# Patient Record
Sex: MALE | Race: WHITE | NOT HISPANIC OR LATINO | Employment: OTHER | URBAN - METROPOLITAN AREA
[De-identification: names, ages, dates, MRNs, and addresses within clinical notes are randomized per-mention and may not be internally consistent; named-entity substitution may affect disease eponyms.]

---

## 2018-03-21 PROBLEM — R07.9 CHEST PAIN: Status: ACTIVE | Noted: 2018-03-21

## 2018-03-22 PROBLEM — I21.3 ST ELEVATION MYOCARDIAL INFARCTION (STEMI): Status: ACTIVE | Noted: 2018-03-22

## 2018-03-22 PROBLEM — I21.21 STEMI INVOLVING LEFT CIRCUMFLEX CORONARY ARTERY: Status: ACTIVE | Noted: 2018-03-22

## 2018-03-22 PROBLEM — F17.200 TOBACCO DEPENDENCY: Status: ACTIVE | Noted: 2018-03-22

## 2018-03-22 PROBLEM — E78.00 HYPERCHOLESTEREMIA: Status: ACTIVE | Noted: 2018-03-22

## 2018-06-12 PROBLEM — I25.119 CORONARY ARTERY DISEASE INVOLVING NATIVE CORONARY ARTERY OF NATIVE HEART WITH ANGINA PECTORIS: Status: ACTIVE | Noted: 2018-06-12

## 2018-06-12 PROBLEM — Z95.5 HISTORY OF CORONARY ARTERY STENT PLACEMENT: Status: ACTIVE | Noted: 2018-06-12

## 2018-06-14 PROBLEM — R07.9 CHEST PAIN: Status: RESOLVED | Noted: 2018-03-21 | Resolved: 2018-06-14

## 2019-05-24 PROBLEM — R19.4 BOWEL HABIT CHANGES: Status: ACTIVE | Noted: 2019-05-24

## 2020-02-17 PROBLEM — I25.10 CORONARY ARTERY DISEASE INVOLVING NATIVE CORONARY ARTERY: Status: ACTIVE | Noted: 2018-06-12

## 2020-02-17 PROBLEM — I10 ESSENTIAL HYPERTENSION: Status: ACTIVE | Noted: 2020-02-17

## 2021-04-07 ENCOUNTER — IMMUNIZATION (OUTPATIENT)
Dept: PRIMARY CARE CLINIC | Facility: CLINIC | Age: 51
End: 2021-04-07
Payer: MEDICARE

## 2021-04-07 DIAGNOSIS — Z23 NEED FOR VACCINATION: Primary | ICD-10-CM

## 2021-05-05 ENCOUNTER — IMMUNIZATION (OUTPATIENT)
Dept: PRIMARY CARE CLINIC | Facility: CLINIC | Age: 51
End: 2021-05-05
Payer: MEDICARE

## 2021-05-05 DIAGNOSIS — Z23 NEED FOR VACCINATION: Primary | ICD-10-CM

## 2023-01-24 PROBLEM — I21.4 NSTEMI (NON-ST ELEVATED MYOCARDIAL INFARCTION): Status: ACTIVE | Noted: 2018-03-22

## 2023-01-25 PROBLEM — R07.9 CHEST PAIN: Status: RESOLVED | Noted: 2018-03-21 | Resolved: 2023-01-25

## 2023-01-30 ENCOUNTER — OFFICE VISIT (OUTPATIENT)
Dept: CARDIOLOGY | Facility: CLINIC | Age: 53
End: 2023-01-30
Payer: MEDICARE

## 2023-01-30 VITALS
BODY MASS INDEX: 32.47 KG/M2 | OXYGEN SATURATION: 97 % | HEIGHT: 75 IN | HEART RATE: 77 BPM | DIASTOLIC BLOOD PRESSURE: 74 MMHG | SYSTOLIC BLOOD PRESSURE: 112 MMHG | WEIGHT: 261.13 LBS

## 2023-01-30 DIAGNOSIS — E78.00 HYPERCHOLESTEREMIA: ICD-10-CM

## 2023-01-30 DIAGNOSIS — Z95.5 S/P DRUG ELUTING CORONARY STENT PLACEMENT: ICD-10-CM

## 2023-01-30 DIAGNOSIS — I10 ESSENTIAL HYPERTENSION: ICD-10-CM

## 2023-01-30 DIAGNOSIS — I25.10 CORONARY ARTERY DISEASE INVOLVING NATIVE CORONARY ARTERY OF NATIVE HEART WITHOUT ANGINA PECTORIS: Primary | ICD-10-CM

## 2023-01-30 PROCEDURE — 99999 PR PBB SHADOW E&M-EST. PATIENT-LVL III: CPT | Mod: PBBFAC,,, | Performed by: INTERNAL MEDICINE

## 2023-01-30 PROCEDURE — 99213 PR OFFICE/OUTPT VISIT, EST, LEVL III, 20-29 MIN: ICD-10-PCS | Mod: S$PBB,,, | Performed by: INTERNAL MEDICINE

## 2023-01-30 PROCEDURE — 99999 PR PBB SHADOW E&M-EST. PATIENT-LVL III: ICD-10-PCS | Mod: PBBFAC,,, | Performed by: INTERNAL MEDICINE

## 2023-01-30 PROCEDURE — 99213 OFFICE O/P EST LOW 20 MIN: CPT | Mod: PBBFAC,PN | Performed by: INTERNAL MEDICINE

## 2023-01-30 PROCEDURE — 99213 OFFICE O/P EST LOW 20 MIN: CPT | Mod: S$PBB,,, | Performed by: INTERNAL MEDICINE

## 2023-01-30 NOTE — PROGRESS NOTES
[facility] - Cardiology [address]  Cardiology Clinic Note      Chief Complaint  Chief Complaint   Patient presents with    Hospital Follow Up       HPI:      52-year-old male with past medical history tobacco use, hypertension, hyperlipidemia, CAD (left dominant or codominant) status post MI s/p PCI LPDA 2018 c/b wire perforation (previously) prior PCI same year proximal to mid LAD / OM1 / dLCx subsequently NSTEMI 01/2023 taken to cath lab culprit ostial to proximal OM2 status post PCI residual nonobstructive disease involving the ostial diagonal/ostial to proximal OM1/mid codominant RCA small vessel beyond the lesion), echo 01/24/2023 normal EF mild LVH normal diastolic function mild RVE with normal RV systolic function echo 2018 normal EF mild LVH normal diastolic function normal RV prior echo same year normal EF moderate LVH impaired relaxation     At discharge added Zetia to statin as the patient was unable to tolerate Lipitor 80 in the past     Patient doing well    Medications  Current Outpatient Medications   Medication Sig Dispense Refill    aspirin 81 MG Chew Take 1 tablet (81 mg total) by mouth once daily. 30 tablet 0    atorvastatin (LIPITOR) 40 MG tablet Take 1 tablet (40 mg total) by mouth once daily. 90 tablet 3    clopidogreL (PLAVIX) 75 mg tablet Take 1 tablet (75 mg total) by mouth once daily. 90 tablet 3    ezetimibe (ZETIA) 10 mg tablet Take 1 tablet (10 mg total) by mouth every evening. 90 tablet 3    metoprolol tartrate (LOPRESSOR) 25 MG tablet Take 1 tablet (25 mg total) by mouth 2 (two) times daily. 60 tablet 11    nitroGLYCERIN (NITROSTAT) 0.4 MG SL tablet Place 0.4 mg under the tongue every 5 (five) minutes as needed for Chest pain.      clopidogrel (PLAVIX) 75 mg tablet Take 75 mg by mouth once daily.      traZODone (DESYREL) 50 MG tablet Take 50 mg by mouth nightly as needed for Insomnia.       No current facility-administered medications for this visit.        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 ENDO;  Service: Endoscopy;  Laterality: N/A;    CORONARY ANGIOGRAPHY Right 1/25/2023    Procedure: ANGIOGRAM, CORONARY ARTERY;  Surgeon: Rashaad Gambino MD;  Location: [facility] CATH LAB;  Service: Cardiology;  Laterality: Right;    CORONARY ANGIOPLASTY WITH STENT PLACEMENT N/A 06/13/2018    Procedure: Angioplasty, Coronary Artery, With Stent Insertion;  Surgeon: Noe Benavides MD;  Location: [facility] CATH LAB;  Service: Cardiovascular;  Laterality: N/A;    CORONARY ANGIOPLASTY WITH STENT PLACEMENT Right 1/25/2023    Procedure: ANGIOPLASTY, CORONARY ARTERY, WITH STENT INSERTION;  Surgeon: Rashaad Gambino MD;  Location: [facility] CATH LAB;  Service: Cardiology;  Laterality: Right;    LEFT HEART CATHETERIZATION Left 06/13/2018    Procedure: Left heart cath;  Surgeon: Noe Benavides MD;  Location: [facility] CATH LAB;  Service: Cardiovascular;  Laterality: Left;     Social History     Socioeconomic History    Marital status:    Tobacco Use    Smoking status: Every Day     Packs/day: 2.00     Types: Cigarettes    Smokeless tobacco: Former     Quit date: 3/26/2018   Substance and Sexual Activity    Alcohol use: No    Drug use: No     Social Determinants of Health     Financial Resource Strain: Low Risk     Difficulty of Paying Living Expenses: Not hard at all   Food Insecurity: No Food Insecurity    Worried About Running Out of Food in the Last Year: Never true    Ran Out of Food in the Last Year: Never true   Transportation Needs: No Transportation Needs    Lack of  Transportation (Medical): No    Lack of Transportation (Non-Medical): No   Physical Activity: Unknown    Days of Exercise per Week: 5 days   Stress: No Stress Concern Present    Feeling of Stress : Only a little   Social Connections: Socially Integrated    Frequency of Communication with Friends and Family: More than three times a week    Frequency of Social Gatherings with Friends and Family: More than three times a week    Attends Church Services: 1 to 4 times per year    Active Member of Clubs or Organizations: Yes    Attends Club or Organization Meetings: 1 to 4 times per year    Marital Status:    Housing Stability: Low Risk     Unable to Pay for Housing in the Last Year: No    Number of Places Lived in the Last Year: 1    Unstable Housing in the Last Year: No     No family history on file.     Allergies  Review of patient's allergies indicates:  No Known Allergies    Review of Systems   Review of Systems   Constitutional: Negative for fever.   HENT:  Negative for nosebleeds.    Eyes:  Negative for visual disturbance.   Cardiovascular:  Negative for chest pain, claudication, dyspnea on exertion, palpitations and syncope.   Respiratory:  Negative for cough, hemoptysis and wheezing.    Endocrine: Negative for cold intolerance, heat intolerance, polyphagia and polyuria.   Hematologic/Lymphatic: Negative for bleeding problem.   Skin:  Negative for rash.   Musculoskeletal:  Negative for myalgias.   Gastrointestinal:  Negative for hematemesis, hematochezia, nausea and vomiting.   Genitourinary:  Negative for dysuria.   Neurological:  Negative for focal weakness and sensory change.   Psychiatric/Behavioral:  Negative for altered mental status.      Physical Exam  Vitals:    01/30/23 1355   BP: 112/74   Pulse: 77     Wt Readings from Last 1 Encounters:   01/30/23 118.5 kg (261 lb 2.2 oz)     Physical Exam  HENT:      Head: Normocephalic and atraumatic.      Mouth/Throat:      Mouth: Mucous membranes are  moist.   Eyes:      Extraocular Movements: Extraocular movements intact.      Pupils: Pupils are equal, round, and reactive to light.   Neck:      Vascular: No carotid bruit or JVD.   Cardiovascular:      Rate and Rhythm: Normal rate and regular rhythm.      Pulses: Normal pulses and intact distal pulses.      Heart sounds: Normal heart sounds. No murmur heard.    No friction rub. No gallop.   Pulmonary:      Effort: Pulmonary effort is normal.      Breath sounds: Normal breath sounds.   Abdominal:      Tenderness: There is no abdominal tenderness. There is no guarding or rebound.   Musculoskeletal:      Right lower leg: No edema.      Left lower leg: No edema.   Skin:     General: Skin is warm and dry.      Capillary Refill: Capillary refill takes less than 2 seconds.   Neurological:      General: No focal deficit present.      Mental Status: He is alert and oriented to person, place, and time.   Psychiatric:         Mood and Affect: Mood normal.       Labs  No results displayed because visit has over 200 results.          EKG  01/24/2023 01/24/2023 normal sinus rhythm normal rate inferior Q-waves nonspecific ST-T changes subtle    Echo   Results for orders placed or performed during the hospital encounter of 01/23/23   Echo   Result Value Ref Range    BSA 2.5 m2    TDI SEPTAL 0.08 m/s    LV LATERAL E/E' RATIO 4.42 m/s    LV SEPTAL E/E' RATIO 6.63 m/s    Left Ventricular Outflow Tract Mean Velocity 0.77 cm/s    Left Ventricular Outflow Tract Mean Gradient 2.65 mmHg    AORTIC VALVE CUSP SEPERATION 1.96 cm    TDI LATERAL 0.12 m/s    PV PEAK VELOCITY 1.21 cm/s    LVIDd 4.50 3.5 - 6.0 cm    IVS 1.38 0.6 - 1.1 cm    Posterior Wall 1.36 0.6 - 1.1 cm    LVIDs 3.57 2.1 - 4.0 cm    FS 21 28 - 44 %    LV mass 240.53 g    RVDD 3.44 cm    Left Ventricle Relative Wall Thickness 0.60 cm    AV Velocity Ratio 0.78     MV valve area p 1/2 method 2.93 cm2    E/A ratio 0.78     Mean e' 0.10 m/s    E wave deceleration time 249.57 msec     LVOT diameter 1.76 cm    LVOT area 2.4 cm2    LVOT peak jim 1.11 m/s    LVOT peak VTI 20.80 cm    Ao peak jim 1.43 m/s    LVOT stroke volume 50.58 cm3    AV peak gradient 8 mmHg    E/E' ratio 5.30 m/s    MV Peak E Jim 0.53 m/s    MV stenosis pressure 1/2 time 74.99 ms    MV Peak A Jim 0.68 m/s    LV Systolic Volume 53.39 mL    LV Systolic Volume Index 21.8 mL/m2    LV Diastolic Volume 116.73 mL    LV Diastolic Volume Index 47.64 mL/m2    LV Mass Index 98 g/m2    RA Major Axis 5.00 cm    Left Atrium Minor Axis 2.77 cm    Left Atrium Major Axis 5.40 cm    LA Volume Index (Mod) 18.8 mL/m2    LA volume (mod) 46.00 cm3    RA Width 2.01 cm    EF 60 %    Sinus 3.80 cm    Proximal aorta 3.60 cm    Narrative    · The left ventricle is normal in size with mild concentric hypertrophy   and normal systolic function.  · The estimated ejection fraction is 60%.  · Normal left ventricular diastolic function.  · Mild right ventricular enlargement with normal right ventricular   systolic function.  · Indeterminate central venous pressure.          Imaging  X-Ray Chest AP Portable    Result Date: 1/23/2023  EXAMINATION: XR CHEST AP PORTABLE CLINICAL HISTORY: chest pain; TECHNIQUE: Single frontal view of the chest was performed. COMPARISON: 04/12/2019, 07/26/2018 FINDINGS: Cardiac silhouette is not enlarged.  Lungs are clear.  There is no pleural effusion or pneumothorax.  Osseous structures are intact.     No acute abnormality. Electronically signed by: Dinah Alegria Date:    01/23/2023 Time:    17:02    Echo    Result Date: 1/24/2023  · The left ventricle is normal in size with mild concentric hypertrophy and normal systolic function. · The estimated ejection fraction is 60%. · Normal left ventricular diastolic function. · Mild right ventricular enlargement with normal right ventricular systolic function. · Indeterminate central venous pressure.      Cardiac catheterization    Result Date: 1/25/2023    The ostial 1st Diag lesion  was 50% stenosed.   The distal / apical  LAD lesion was 100% stenosed.   The 1st Mrg lesion was 60% stenosed (ostial to proximal).   The Mid RCA lesion was 80% stenosed.  Small vessel distal to lesion.   The 2nd Mrg-1 lesion was 90% stenosed with 0% stenosis post-intervention. This was the culprit lesion.   A STENT RESOLUTE CHARLY [id number] stent was successfully placed at 14 JAMIA for 30 sec in the ostial to proximal OM2 (overlapping with the prior stent) and post dilated with a 3.0 mm NC balloon.   Patent LAD stent.  Patent OM2 stent.  Patent LPDA stent. The procedure log was documented by Documenter: RT Anjua and verified by Rashaad Gambino MD. Date: 1/25/2023  Time: 8:20 AM       Prior coronary angiogram / intervention:  See HPI    Assessment and Plan  1. Coronary artery disease involving native coronary artery of native heart without angina pectoris  Continue dual antiplatelet therapy with aspirin 81 Plavix 75 in addition to metoprolol (may take 12.5 BID - split tab since he believes BP may be on the low side) and Lipitor 40 plus Zetia 10  No access site hematoma    2. Essential hypertension  BB controlled    3. Hypercholesteremia  Statin zetia goal LDL < 70 check next visit        Follow Up  3 months for FLP      Burton Segal MD, FACC, RPVI  Interventional Cardiology

## 2023-03-06 PROBLEM — I25.10 CORONARY ARTERY DISEASE INVOLVING NATIVE CORONARY ARTERY OF NATIVE HEART WITHOUT ANGINA PECTORIS: Status: ACTIVE | Noted: 2023-03-06

## 2023-05-01 ENCOUNTER — OFFICE VISIT (OUTPATIENT)
Dept: CARDIOLOGY | Facility: CLINIC | Age: 53
End: 2023-05-01
Payer: MEDICARE

## 2023-05-01 VITALS
DIASTOLIC BLOOD PRESSURE: 80 MMHG | SYSTOLIC BLOOD PRESSURE: 113 MMHG | HEART RATE: 68 BPM | OXYGEN SATURATION: 96 % | HEIGHT: 75 IN | WEIGHT: 275.44 LBS | BODY MASS INDEX: 34.25 KG/M2

## 2023-05-01 DIAGNOSIS — E78.00 HYPERCHOLESTEREMIA: ICD-10-CM

## 2023-05-01 DIAGNOSIS — I10 ESSENTIAL HYPERTENSION: ICD-10-CM

## 2023-05-01 DIAGNOSIS — I25.10 CORONARY ARTERY DISEASE INVOLVING NATIVE CORONARY ARTERY OF NATIVE HEART WITHOUT ANGINA PECTORIS: Primary | ICD-10-CM

## 2023-05-01 PROBLEM — I21.4 NSTEMI (NON-ST ELEVATED MYOCARDIAL INFARCTION): Status: RESOLVED | Noted: 2018-03-22 | Resolved: 2023-05-01

## 2023-05-01 PROCEDURE — 99213 OFFICE O/P EST LOW 20 MIN: CPT | Mod: PBBFAC,PN | Performed by: INTERNAL MEDICINE

## 2023-05-01 PROCEDURE — 99213 OFFICE O/P EST LOW 20 MIN: CPT | Mod: S$PBB,,, | Performed by: INTERNAL MEDICINE

## 2023-05-01 PROCEDURE — 99999 PR PBB SHADOW E&M-EST. PATIENT-LVL III: ICD-10-PCS | Mod: PBBFAC,,, | Performed by: INTERNAL MEDICINE

## 2023-05-01 PROCEDURE — 93005 ELECTROCARDIOGRAM TRACING: CPT | Mod: PBBFAC,PN | Performed by: INTERNAL MEDICINE

## 2023-05-01 PROCEDURE — 99999 PR PBB SHADOW E&M-EST. PATIENT-LVL III: CPT | Mod: PBBFAC,,, | Performed by: INTERNAL MEDICINE

## 2023-05-01 PROCEDURE — 93010 EKG 12-LEAD: ICD-10-PCS | Mod: S$PBB,,, | Performed by: INTERNAL MEDICINE

## 2023-05-01 PROCEDURE — 93010 ELECTROCARDIOGRAM REPORT: CPT | Mod: S$PBB,,, | Performed by: INTERNAL MEDICINE

## 2023-05-01 PROCEDURE — 99213 PR OFFICE/OUTPT VISIT, EST, LEVL III, 20-29 MIN: ICD-10-PCS | Mod: S$PBB,,, | Performed by: INTERNAL MEDICINE

## 2023-05-01 NOTE — PROGRESS NOTES
[facility] - Cardiology [address]  Cardiology Clinic Note      Chief Complaint  Chief Complaint   Patient presents with    Follow-up    Shortness of Breath     With exertion    Palpitations    Chest Pain     pressure       HPI:      52-year-old male with past medical history tobacco use, hypertension, hyperlipidemia, CAD (left dominant or codominant) status post MI s/p PCI LPDA 2018 c/b wire perforation (previously) prior PCI same year proximal to mid LAD / OM1 / dLCx subsequently NSTEMI 01/2023 taken to cath lab culprit ostial to proximal OM2 status post PCI residual nonobstructive disease involving the ostial diagonal/ostial to proximal OM1/mid codominant RCA small vessel beyond the lesion), echo 01/24/2023 normal EF mild LVH normal diastolic function mild RVE with normal RV systolic function echo 2018 normal EF mild LVH normal diastolic function normal RV prior echo same year normal EF moderate LVH impaired relaxation     At discharge for above NSTEMI added Zetia to statin as the patient was unable to tolerate Lipitor 80 in the past     Patient doing well    Medications  Current Outpatient Medications   Medication Sig Dispense Refill    aspirin 81 MG Chew Take 1 tablet (81 mg total) by mouth once daily. 30 tablet 0    atorvastatin (LIPITOR) 40 MG tablet Take 1 tablet (40 mg total) by mouth once daily. 90 tablet 3    clopidogreL (PLAVIX) 75 mg tablet Take 1 tablet (75 mg total) by mouth once daily. 90 tablet 3    ezetimibe (ZETIA) 10 mg tablet Take 1 tablet (10 mg total) by mouth every evening. 90 tablet 3    metoprolol tartrate (LOPRESSOR) 25 MG tablet Take 1 tablet (25 mg total) by mouth 2 (two) times daily. 60 tablet 11    nitroGLYCERIN (NITROSTAT) 0.4 MG SL tablet Place 0.4 mg under the tongue every 5 (five) minutes as needed for Chest pain.      traZODone (DESYREL) 50 MG tablet Take 50 mg by mouth nightly as needed for Insomnia.       No current facility-administered medications for this visit.         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    Hyperlipidemia     Hypertension     STEMI (ST elevation myocardial infarction) 03/2018     Past Surgical History:   Procedure Laterality Date    ANGIOGRAM, CORONARY ARTERY - Right  01/25/2023    prom OM 2 stent placed    COLONOSCOPY N/A 05/24/2019    Procedure: COLONOSCOPY;  Surgeon: Daniel Weston MD;  Location: [facility] ENDO;  Service: Endoscopy;  Laterality: N/A;    CORONARY ANGIOGRAPHY Right 1/25/2023    Procedure: ANGIOGRAM, CORONARY ARTERY;  Surgeon: Rashaad Gambino MD;  Location: [facility] CATH LAB;  Service: Cardiology;  Laterality: Right;    CORONARY ANGIOPLASTY WITH STENT PLACEMENT N/A 06/13/2018    Procedure: Angioplasty, Coronary Artery, With Stent Insertion;  Surgeon: Noe Benavides MD;  Location: [facility] CATH LAB;  Service: Cardiovascular;  Laterality: N/A;    CORONARY ANGIOPLASTY WITH STENT PLACEMENT Right 1/25/2023    Procedure: ANGIOPLASTY, CORONARY ARTERY, WITH STENT INSERTION;  Surgeon: Rashaad Gambino MD;  Location: [facility] CATH LAB;  Service: Cardiology;  Laterality: Right;    LEFT HEART CATHETERIZATION Left 06/13/2018    Procedure: Left heart cath;  Surgeon: Noe Benavides MD;  Location: [facility] CATH LAB;  Service: Cardiovascular;  Laterality: Left;     Social History     Socioeconomic History    Marital status:    Tobacco Use    Smoking status: Former     Packs/day: 2.00     Types: Cigarettes    Smokeless tobacco: Former     Quit date: 3/26/2018   Substance and Sexual Activity    Alcohol use: No    Drug use: No     Social Determinants of Health     Financial Resource Strain: Low Risk     Difficulty of Paying Living Expenses: Not hard at all   Food Insecurity: No Food Insecurity    Worried About Running Out of Food in the Last Year: Never true    Ran Out of Food in the Last Year: Never true   Transportation Needs: No Transportation  Needs    Lack of Transportation (Medical): No    Lack of Transportation (Non-Medical): No   Physical Activity: Unknown    Days of Exercise per Week: 5 days   Stress: No Stress Concern Present    Feeling of Stress : Only a little   Social Connections: Socially Integrated    Frequency of Communication with Friends and Family: More than three times a week    Frequency of Social Gatherings with Friends and Family: More than three times a week    Attends [employer] Services: 1 to 4 times per year    Active Member of Clubs or Organizations: Yes    Attends Club or Organization Meetings: 1 to 4 times per year    Marital Status:    Housing Stability: Low Risk     Unable to Pay for Housing in the Last Year: No    Number of Places Lived in the Last Year: 1    Unstable Housing in the Last Year: No     History reviewed. No pertinent family history.     Allergies  Review of patient's allergies indicates:  No Known Allergies    Review of Systems   Review of Systems   Constitutional: Negative for fever.   HENT:  Negative for nosebleeds.    Eyes:  Negative for visual disturbance.   Cardiovascular:  Negative for chest pain, claudication, dyspnea on exertion, palpitations and syncope.   Respiratory:  Negative for cough, hemoptysis and wheezing.    Endocrine: Negative for cold intolerance, heat intolerance, polyphagia and polyuria.   Hematologic/Lymphatic: Negative for bleeding problem.   Skin:  Negative for rash.   Musculoskeletal:  Negative for myalgias.   Gastrointestinal:  Negative for hematemesis, hematochezia, nausea and vomiting.   Genitourinary:  Negative for dysuria.   Neurological:  Negative for focal weakness and sensory change.   Psychiatric/Behavioral:  Negative for altered mental status.      Physical Exam  Vitals:    05/01/23 1105   BP: 113/80   Pulse: 68     Wt Readings from Last 1 Encounters:   05/01/23 125 kg (275 lb 7.4 oz)     Physical Exam  HENT:      Head: Normocephalic and atraumatic.      Mouth/Throat:       Mouth: Mucous membranes are moist.   Eyes:      Extraocular Movements: Extraocular movements intact.      Pupils: Pupils are equal, round, and reactive to light.   Neck:      Vascular: No carotid bruit or JVD.   Cardiovascular:      Rate and Rhythm: Normal rate and regular rhythm.      Pulses: Normal pulses and intact distal pulses.      Heart sounds: Normal heart sounds. No murmur heard.    No friction rub. No gallop.   Pulmonary:      Effort: Pulmonary effort is normal.      Breath sounds: Normal breath sounds.   Abdominal:      Tenderness: There is no abdominal tenderness. There is no guarding or rebound.   Musculoskeletal:      Right lower leg: No edema.      Left lower leg: No edema.   Skin:     General: Skin is warm and dry.      Capillary Refill: Capillary refill takes less than 2 seconds.   Neurological:      General: No focal deficit present.      Mental Status: He is alert and oriented to person, place, and time.   Psychiatric:         Mood and Affect: Mood normal.       Labs  No results displayed because visit has over 200 results.          EKG  01/24/2023 01/24/2023 normal sinus rhythm normal rate inferior Q-waves nonspecific ST-T changes subtle  EKG 05/01/2023 normal sinus rhythm normal rate subtle nonspecific ST-T changes inferior Q-waves    Echo   Results for orders placed or performed during the hospital encounter of 01/23/23   Echo   Result Value Ref Range    BSA 2.5 m2    TDI SEPTAL 0.08 m/s    LV LATERAL E/E' RATIO 4.42 m/s    LV SEPTAL E/E' RATIO 6.63 m/s    Left Ventricular Outflow Tract Mean Velocity 0.77 cm/s    Left Ventricular Outflow Tract Mean Gradient 2.65 mmHg    AORTIC VALVE CUSP SEPERATION 1.96 cm    TDI LATERAL 0.12 m/s    PV PEAK VELOCITY 1.21 cm/s    LVIDd 4.50 3.5 - 6.0 cm    IVS 1.38 0.6 - 1.1 cm    Posterior Wall 1.36 0.6 - 1.1 cm    LVIDs 3.57 2.1 - 4.0 cm    FS 21 28 - 44 %    LV mass 240.53 g    RVDD 3.44 cm    Left Ventricle Relative Wall Thickness 0.60 cm    AV Velocity  Ratio 0.78     MV valve area p 1/2 method 2.93 cm2    E/A ratio 0.78     Mean e' 0.10 m/s    E wave deceleration time 249.57 msec    LVOT diameter 1.76 cm    LVOT area 2.4 cm2    LVOT peak jim 1.11 m/s    LVOT peak VTI 20.80 cm    Ao peak jim 1.43 m/s    LVOT stroke volume 50.58 cm3    AV peak gradient 8 mmHg    E/E' ratio 5.30 m/s    MV Peak E Jim 0.53 m/s    MV stenosis pressure 1/2 time 74.99 ms    MV Peak A Jim 0.68 m/s    LV Systolic Volume 53.39 mL    LV Systolic Volume Index 21.8 mL/m2    LV Diastolic Volume 116.73 mL    LV Diastolic Volume Index 47.64 mL/m2    LV Mass Index 98 g/m2    RA Major Axis 5.00 cm    Left Atrium Minor Axis 2.77 cm    Left Atrium Major Axis 5.40 cm    LA Volume Index (Mod) 18.8 mL/m2    LA volume (mod) 46.00 cm3    RA Width 2.01 cm    EF 60 %    Sinus 3.80 cm    Proximal aorta 3.60 cm    Narrative    · The left ventricle is normal in size with mild concentric hypertrophy   and normal systolic function.  · The estimated ejection fraction is 60%.  · Normal left ventricular diastolic function.  · Mild right ventricular enlargement with normal right ventricular   systolic function.  · Indeterminate central venous pressure.          Imaging  X-Ray Chest AP Portable    Result Date: 1/23/2023  EXAMINATION: XR CHEST AP PORTABLE CLINICAL HISTORY: chest pain; TECHNIQUE: Single frontal view of the chest was performed. COMPARISON: 04/12/2019, 07/26/2018 FINDINGS: Cardiac silhouette is not enlarged.  Lungs are clear.  There is no pleural effusion or pneumothorax.  Osseous structures are intact.     No acute abnormality. Electronically signed by: Dinah Alegria Date:    01/23/2023 Time:    17:02    Echo    Result Date: 1/24/2023  · The left ventricle is normal in size with mild concentric hypertrophy and normal systolic function. · The estimated ejection fraction is 60%. · Normal left ventricular diastolic function. · Mild right ventricular enlargement with normal right ventricular systolic  function. · Indeterminate central venous pressure.      Cardiac catheterization    Result Date: 1/25/2023    The ostial 1st Diag lesion was 50% stenosed.   The distal / apical  LAD lesion was 100% stenosed.   The 1st Mrg lesion was 60% stenosed (ostial to proximal).   The Mid RCA lesion was 80% stenosed.  Small vessel distal to lesion.   The 2nd Mrg-1 lesion was 90% stenosed with 0% stenosis post-intervention. This was the culprit lesion.   A STENT RESOLUTE CHARLY [id number] stent was successfully placed at 14 JAMIA for 30 sec in the ostial to proximal OM2 (overlapping with the prior stent) and post dilated with a 3.0 mm NC balloon.   Patent LAD stent.  Patent OM2 stent.  Patent LPDA stent. The procedure log was documented by Documenter: RT Anuja and verified by Rashaad Gambino MD. Date: 1/25/2023  Time: 8:20 AM       Prior coronary angiogram / intervention:  See HPI    Assessment and Plan  1. Coronary artery disease involving native coronary artery of native heart without angina pectoris  Continue dual antiplatelet therapy with aspirin 81 Plavix 75 in addition to metoprolol (may take 12.5 BID - split tab since he believes BP may be on the low side) and Lipitor 40 plus Zetia 10    2. Essential hypertension  BB controlled    3. Hypercholesteremia  Statin zetia goal LDL < 70  Check lipids      Follow Up  6 months      Burton Segal MD, FACC, VI  Interventional Cardiology

## 2023-05-04 PROBLEM — R06.83 SNORING: Status: ACTIVE | Noted: 2023-05-04

## 2023-05-04 PROBLEM — R53.82 CHRONIC FATIGUE: Status: ACTIVE | Noted: 2023-05-04

## 2023-09-18 ENCOUNTER — PATIENT MESSAGE (OUTPATIENT)
Dept: PEDIATRICS | Facility: CLINIC | Age: 53
End: 2023-09-18
Payer: MEDICARE

## 2023-10-17 ENCOUNTER — PATIENT MESSAGE (OUTPATIENT)
Dept: PODIATRY | Facility: CLINIC | Age: 53
End: 2023-10-17
Payer: MEDICARE

## 2024-02-19 ENCOUNTER — TELEPHONE (OUTPATIENT)
Dept: CARDIOLOGY | Facility: CLINIC | Age: 54
End: 2024-02-19
Payer: MEDICARE

## 2024-02-19 NOTE — TELEPHONE ENCOUNTER
Spoke with Sandra, patient has had chest pains in the past and missed a follow up appointment that should have been in December.  She states he does not have any current systems and requests a follow up appointment.  Appointment scheduled 04/12/2024 and placed on wait list.  She verbalized understanding.

## 2024-02-19 NOTE — TELEPHONE ENCOUNTER
----- Message from Gladis Masterson sent at 2/19/2024  8:48 AM CST -----  Regarding: Appt  Contact: Sandra [phone]  Sandra/ wife is calling to schedule a appt for pt states he is having chests pains along with some other issues please call

## 2024-04-12 ENCOUNTER — OFFICE VISIT (OUTPATIENT)
Dept: CARDIOLOGY | Facility: CLINIC | Age: 54
End: 2024-04-12
Payer: MEDICARE

## 2024-04-12 VITALS
OXYGEN SATURATION: 97 % | DIASTOLIC BLOOD PRESSURE: 88 MMHG | WEIGHT: 298.38 LBS | SYSTOLIC BLOOD PRESSURE: 126 MMHG | HEART RATE: 75 BPM | BODY MASS INDEX: 37.1 KG/M2 | HEIGHT: 75 IN

## 2024-04-12 DIAGNOSIS — I25.10 CORONARY ARTERY DISEASE INVOLVING NATIVE CORONARY ARTERY OF NATIVE HEART WITHOUT ANGINA PECTORIS: ICD-10-CM

## 2024-04-12 DIAGNOSIS — E78.00 HYPERCHOLESTEREMIA: ICD-10-CM

## 2024-04-12 DIAGNOSIS — E66.01 SEVERE OBESITY (BMI 35.0-39.9) WITH COMORBIDITY: Primary | ICD-10-CM

## 2024-04-12 DIAGNOSIS — Z95.5 S/P DRUG ELUTING CORONARY STENT PLACEMENT: ICD-10-CM

## 2024-04-12 DIAGNOSIS — I10 ESSENTIAL HYPERTENSION: ICD-10-CM

## 2024-04-12 DIAGNOSIS — R07.9 CHEST PAIN, UNSPECIFIED TYPE: ICD-10-CM

## 2024-04-12 DIAGNOSIS — R06.09 DOE (DYSPNEA ON EXERTION): ICD-10-CM

## 2024-04-12 PROCEDURE — 99214 OFFICE O/P EST MOD 30 MIN: CPT | Mod: PBBFAC,PN | Performed by: INTERNAL MEDICINE

## 2024-04-12 PROCEDURE — 93010 ELECTROCARDIOGRAM REPORT: CPT | Mod: S$PBB,,, | Performed by: INTERNAL MEDICINE

## 2024-04-12 PROCEDURE — 93005 ELECTROCARDIOGRAM TRACING: CPT | Mod: PBBFAC,PN | Performed by: INTERNAL MEDICINE

## 2024-04-12 PROCEDURE — 99214 OFFICE O/P EST MOD 30 MIN: CPT | Mod: S$PBB,,, | Performed by: INTERNAL MEDICINE

## 2024-04-12 PROCEDURE — 99999 PR PBB SHADOW E&M-EST. PATIENT-LVL IV: CPT | Mod: PBBFAC,,, | Performed by: INTERNAL MEDICINE

## 2024-04-12 NOTE — PROGRESS NOTES
[facility] - Cardiology [address]  Cardiology Clinic Note      Chief Complaint  Chief Complaint   Patient presents with    Follow-up    Numbness     Bilateral hands       HPI:      52-year-old male with past medical history tobacco use, hypertension, hyperlipidemia, CAD (left dominant or codominant) status post MI s/p PCI LPDA 2018 c/b wire perforation (previously) prior PCI same year proximal to mid LAD / OM1 / dLCx subsequently NSTEMI 01/2023 taken to cath lab culprit ostial to proximal OM2 status post PCI residual nonobstructive disease involving the ostial diagonal/ostial to proximal OM1/mid codominant RCA small vessel beyond the lesion), echo 01/24/2023 normal EF mild LVH normal diastolic function mild RVE with normal RV systolic function echo 2018 normal EF mild LVH normal diastolic function normal RV prior echo same year normal EF moderate LVH impaired relaxation     At discharge for above MI added Zetia to statin as the patient was unable to tolerate Lipitor 80 in the past  Changed Lipitor to Crestor 40 given LDL not at goal     The patient states he has chronic stable dyspnea on exertion    Medications  Current Outpatient Medications   Medication Sig Dispense Refill    aspirin 81 MG Chew Take 1 tablet (81 mg total) by mouth once daily. 30 tablet 0    clopidogreL (PLAVIX) 75 mg tablet Take 1 tablet (75 mg total) by mouth once daily. 90 tablet 3    ezetimibe (ZETIA) 10 mg tablet Take 1 tablet (10 mg total) by mouth every evening. 90 tablet 3    metoprolol tartrate (LOPRESSOR) 25 MG tablet Take 1 tablet (25 mg total) by mouth 2 (two) times daily. 60 tablet 11    nitroGLYCERIN (NITROSTAT) 0.4 MG SL tablet Place 0.4 mg under the tongue every 5 (five) minutes as needed for Chest pain.      rosuvastatin (CRESTOR) 40 MG Tab Take 1 tablet (40 mg total) by mouth every evening. 90 tablet 3    traZODone (DESYREL) 50 MG tablet Take 50 mg by mouth nightly as needed for Insomnia. (Patient not taking: Reported on 4/12/2024)        No current facility-administered medications for this visit.        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    Hyperlipidemia     Hypertension     STEMI (ST elevation myocardial infarction) 03/2018     Past Surgical History:   Procedure Laterality Date    ANGIOGRAM, CORONARY ARTERY - Right  01/25/2023    prom OM 2 stent placed    COLONOSCOPY N/A 05/24/2019    Procedure: COLONOSCOPY;  Surgeon: Daniel Weston MD;  Location: [facility] ENDO;  Service: Endoscopy;  Laterality: N/A;    CORONARY ANGIOGRAPHY Right 1/25/2023    Procedure: ANGIOGRAM, CORONARY ARTERY;  Surgeon: Rashaad Gambino MD;  Location: [facility] CATH LAB;  Service: Cardiology;  Laterality: Right;    CORONARY ANGIOPLASTY WITH STENT PLACEMENT N/A 06/13/2018    Procedure: Angioplasty, Coronary Artery, With Stent Insertion;  Surgeon: Noe Benavides MD;  Location: [facility] CATH LAB;  Service: Cardiovascular;  Laterality: N/A;    CORONARY ANGIOPLASTY WITH STENT PLACEMENT Right 1/25/2023    Procedure: ANGIOPLASTY, CORONARY ARTERY, WITH STENT INSERTION;  Surgeon: Rashaad Gambino MD;  Location: [facility] CATH LAB;  Service: Cardiology;  Laterality: Right;    LEFT HEART CATHETERIZATION Left 06/13/2018    Procedure: Left heart cath;  Surgeon: Noe Benavides MD;  Location: [facility] CATH LAB;  Service: Cardiovascular;  Laterality: Left;     Social History     Socioeconomic History    Marital status:    Tobacco Use    Smoking status: Former     Current packs/day: 2.00     Types: Cigarettes    Smokeless tobacco: Former     Quit date: 3/26/2018   Substance and Sexual Activity    Alcohol use: No    Drug use: No     Social Determinants of Health     Financial Resource Strain: Low Risk  (1/24/2023)    Overall Financial Resource Strain (CARDIA)     Difficulty of Paying Living Expenses: Not hard at all   Food Insecurity: No Food Insecurity (1/24/2023)     Hunger Vital Sign     Worried About Running Out of Food in the Last Year: Never true     Ran Out of Food in the Last Year: Never true   Transportation Needs: No Transportation Needs (1/24/2023)    PRAPARE - Transportation     Lack of Transportation (Medical): No     Lack of Transportation (Non-Medical): No   Physical Activity: Unknown (1/24/2023)    Exercise Vital Sign     Days of Exercise per Week: 5 days   Stress: No Stress Concern Present (1/24/2023)    [employer] - Occupational Stress Questionnaire     Feeling of Stress : Only a little   Social Connections: Socially Integrated (1/24/2023)    Social Connection and Isolation Panel [NHANES]     Frequency of Communication with Friends and Family: More than three times a week     Frequency of Social Gatherings with Friends and Family: More than three times a week     Attends [employer] Services: 1 to 4 times per year     Active Member of Clubs or Organizations: Yes     Attends Club or Organization Meetings: 1 to 4 times per year     Marital Status:    Housing Stability: Low Risk  (1/24/2023)    Housing Stability Vital Sign     Unable to Pay for Housing in the Last Year: No     Number of Places Lived in the Last Year: 1     Unstable Housing in the Last Year: No     No family history on file.     Allergies  Review of patient's allergies indicates:  No Known Allergies    Review of Systems   Review of Systems   Constitutional: Negative for fever.   HENT:  Negative for nosebleeds.    Eyes:  Negative for visual disturbance.   Cardiovascular:  Positive for dyspnea on exertion. Negative for chest pain, claudication, palpitations and syncope.   Respiratory:  Negative for cough, hemoptysis and wheezing.    Endocrine: Negative for cold intolerance, heat intolerance, polyphagia and polyuria.   Hematologic/Lymphatic: Negative for bleeding problem.   Skin:  Negative for rash.   Musculoskeletal:  Negative for myalgias.   Gastrointestinal:   Negative for hematemesis, hematochezia, nausea and vomiting.   Genitourinary:  Negative for dysuria.   Neurological:  Negative for focal weakness and sensory change.   Psychiatric/Behavioral:  Negative for altered mental status.        Physical Exam  Vitals:    04/12/24 1423   BP: 126/88   Pulse: 75     Wt Readings from Last 1 Encounters:   04/12/24 135.3 kg (298 lb 6.3 oz)     Physical Exam  HENT:      Head: Normocephalic and atraumatic.      Mouth/Throat:      Mouth: Mucous membranes are moist.   Eyes:      Extraocular Movements: Extraocular movements intact.      Pupils: Pupils are equal, round, and reactive to light.   Neck:      Vascular: No carotid bruit or JVD.   Cardiovascular:      Rate and Rhythm: Normal rate and regular rhythm.      Pulses: Normal pulses and intact distal pulses.      Heart sounds: Normal heart sounds. No murmur heard.     No friction rub. No gallop.   Pulmonary:      Effort: Pulmonary effort is normal.      Breath sounds: Normal breath sounds.   Abdominal:      Tenderness: There is no abdominal tenderness. There is no guarding or rebound.   Musculoskeletal:      Right lower leg: No edema.      Left lower leg: No edema.   Skin:     General: Skin is warm and dry.      Capillary Refill: Capillary refill takes less than 2 seconds.   Neurological:      General: No focal deficit present.      Mental Status: He is alert and oriented to person, place, and time.   Psychiatric:         Mood and Affect: Mood normal.         Labs  No visits with results within 6 Month(s) from this visit.   Latest known visit with results is:   Lab Visit on 05/08/2023   Component Date Value Ref Range Status    Cholesterol 05/08/2023 166  120 - 199 mg/dL Final    Comment: The National Cholesterol Education Program (NCEP) has set the  following guidelines (reference ranges) for Cholesterol:  Optimal.....................<200 mg/dL  Borderline High.............200-239 mg/dL  High........................> or = 240 mg/dL       Triglycerides 05/08/2023 97  30 - 150 mg/dL Final    Comment: The National Cholesterol Education Program (NCEP) has set the  following guidelines (reference values) for triglycerides:  Normal......................<150 mg/dL  Borderline High.............150-199 mg/dL  High........................200-499 mg/dL      HDL 05/08/2023 47  40 - 75 mg/dL Final    Comment: The National Cholesterol Education Program (NCEP) has set the  following guidelines (reference values) for HDL Cholesterol:  Low...............<40 mg/dL  Optimal...........>60 mg/dL      LDL Cholesterol 05/08/2023 99.6  63.0 - 159.0 mg/dL Final    Comment: The National Cholesterol Education Program (NCEP) has set the  following guidelines (reference values) for LDL Cholesterol:  Optimal.......................<130 mg/dL  Borderline High...............130-159 mg/dL  High..........................160-189 mg/dL  Very High.....................>190 mg/dL      HDL/Cholesterol Ratio 05/08/2023 28.3  20.0 - 50.0 % Final    Total Cholesterol/HDL Ratio 05/08/2023 3.5  2.0 - 5.0 Final    Non-HDL Cholesterol 05/08/2023 119  mg/dL Final    Comment: Risk category and Non-HDL cholesterol goals:  Coronary heart disease (CHD)or equivalent (10-year risk of CHD >20%):  Non-HDL cholesterol goal     <130 mg/dL  Two or more CHD risk factors and 10-year risk of CHD <= 20%:  Non-HDL cholesterol goal     <160 mg/dL  0 to 1 CHD risk factor:  Non-HDL cholesterol goal     <190 mg/dL      Hepatitis C Ab 05/08/2023 Non-reactive  Non-reactive Final    HIV 1/2 Ag/Ab 05/08/2023 Non-reactive  Non-reactive Final    HCV Quantitative Result 05/08/2023 Not Detected  <12 IU/mL Final    HCV, Qualitative 05/08/2023 Not Detected  Not Detected Final    Comment: This procedure utilizes a real-time polymerase chain reaction test  from [employer]. The amplification target is a conserved region   of the HCV genome. The lower limit of quantitation is <12 IU/mL   (<1.08 Log IU/mL)and the upper  limit of quantitation is 30 million   IU/mL (7.48 Log IU/mL).     Specimens reported as Detected but <12 IU/mL contain detectable  levels of Hepatitis C RNA but the viral load is below the limit of   quantitation. A Not Detected result does not rule out HCV infection,   clinical correlation is recommended.      WBC 05/08/2023 8.45  3.90 - 12.70 K/uL Final    RBC 05/08/2023 4.50 (L)  4.60 - 6.20 M/uL Final    Hemoglobin 05/08/2023 14.1  14.0 - 18.0 g/dL Final    Hematocrit 05/08/2023 43.2  40.0 - 54.0 % Final    MCV 05/08/2023 96  82 - 98 fL Final    MCH 05/08/2023 31.3 (H)  27.0 - 31.0 pg Final    MCHC 05/08/2023 32.6  32.0 - 36.0 g/dL Final    RDW 05/08/2023 13.2  11.5 - 14.5 % Final    Platelets 05/08/2023 243  150 - 450 K/uL Final    MPV 05/08/2023 10.0  9.2 - 12.9 fL Final    Immature Granulocytes 05/08/2023 0.2  0.0 - 0.5 % Final    Gran # (ANC) 05/08/2023 4.7  1.8 - 7.7 K/uL Final    Immature Grans (Abs) 05/08/2023 0.02  0.00 - 0.04 K/uL Final    Comment: Mild elevation in immature granulocytes is non specific and   can be seen in a variety of conditions including stress response,   acute inflammation, trauma and pregnancy. Correlation with other   laboratory and clinical findings is essential.      Lymph # 05/08/2023 2.9  1.0 - 4.8 K/uL Final    Mono # 05/08/2023 0.6  0.3 - 1.0 K/uL Final    Eos # 05/08/2023 0.3  0.0 - 0.5 K/uL Final    Baso # 05/08/2023 0.06  0.00 - 0.20 K/uL Final    nRBC 05/08/2023 0  0 /100 WBC Final    Gran % 05/08/2023 55.3  38.0 - 73.0 % Final    Lymph % 05/08/2023 33.7  18.0 - 48.0 % Final    Mono % 05/08/2023 7.1  4.0 - 15.0 % Final    Eosinophil % 05/08/2023 3.0  0.0 - 8.0 % Final    Basophil % 05/08/2023 0.7  0.0 - 1.9 % Final    Differential Method 05/08/2023 Automated   Final    Sodium 05/08/2023 141  136 - 145 mmol/L Final    Potassium 05/08/2023 5.0  3.5 - 5.1 mmol/L Final    Chloride 05/08/2023 106  95 - 110 mmol/L Final    CO2 05/08/2023 26  23 - 29 mmol/L Final    Glucose  05/08/2023 99  70 - 110 mg/dL Final    BUN 05/08/2023 22 (H)  6 - 20 mg/dL Final    Creatinine 05/08/2023 1.5 (H)  0.5 - 1.4 mg/dL Final    Calcium 05/08/2023 9.5  8.7 - 10.5 mg/dL Final    Total Protein 05/08/2023 8.3  6.0 - 8.4 g/dL Final    Albumin 05/08/2023 4.2  3.5 - 5.2 g/dL Final    Total Bilirubin 05/08/2023 0.5  0.1 - 1.0 mg/dL Final    Comment: For infants and newborns, interpretation of results should be based  on gestational age, weight and in agreement with clinical  observations.    Premature Infant recommended reference ranges:  Up to 24 hours.............<8.0 mg/dL  Up to 48 hours............<12.0 mg/dL  3-5 days..................<15.0 mg/dL  6-29 days.................<15.0 mg/dL      Alkaline Phosphatase 05/08/2023 65  55 - 135 U/L Final    AST 05/08/2023 22  10 - 40 U/L Final    ALT 05/08/2023 27  10 - 44 U/L Final    Anion Gap 05/08/2023 9  8 - 16 mmol/L Final    eGFR 05/08/2023 55.0 (A)  >60 mL/min/1.73 m^2 Final    Hemoglobin A1C 05/08/2023 5.6  4.0 - 5.6 % Final    Comment: ADA Screening Guidelines:  5.7-6.4%  Consistent with prediabetes  >or=6.5%  Consistent with diabetes    High levels of fetal hemoglobin interfere with the HbA1C  assay. Heterozygous hemoglobin variants (HbS, HgC, etc)do  not significantly interfere with this assay.   However, presence of multiple variants may affect accuracy.      Estimated Avg Glucose 05/08/2023 114  68 - 131 mg/dL Final    Cholesterol 05/08/2023 166  120 - 199 mg/dL Final    Comment: The National Cholesterol Education Program (NCEP) has set the  following guidelines (reference ranges) for Cholesterol:  Optimal.....................<200 mg/dL  Borderline High.............200-239 mg/dL  High........................> or = 240 mg/dL      Triglycerides 05/08/2023 97  30 - 150 mg/dL Final    Comment: The National Cholesterol Education Program (NCEP) has set the  following guidelines (reference values) for triglycerides:  Normal......................<150  mg/dL  Borderline High.............150-199 mg/dL  High........................200-499 mg/dL      HDL 05/08/2023 47  40 - 75 mg/dL Final    Comment: The National Cholesterol Education Program (NCEP) has set the  following guidelines (reference values) for HDL Cholesterol:  Low...............<40 mg/dL  Optimal...........>60 mg/dL      LDL Cholesterol 05/08/2023 99.6  63.0 - 159.0 mg/dL Final    Comment: The National Cholesterol Education Program (NCEP) has set the  following guidelines (reference values) for LDL Cholesterol:  Optimal.......................<130 mg/dL  Borderline High...............130-159 mg/dL  High..........................160-189 mg/dL  Very High.....................>190 mg/dL      HDL/Cholesterol Ratio 05/08/2023 28.3  20.0 - 50.0 % Final    Total Cholesterol/HDL Ratio 05/08/2023 3.5  2.0 - 5.0 Final    Non-HDL Cholesterol 05/08/2023 119  mg/dL Final    Comment: Risk category and Non-HDL cholesterol goals:  Coronary heart disease (CHD)or equivalent (10-year risk of CHD >20%):  Non-HDL cholesterol goal     <130 mg/dL  Two or more CHD risk factors and 10-year risk of CHD <= 20%:  Non-HDL cholesterol goal     <160 mg/dL  0 to 1 CHD risk factor:  Non-HDL cholesterol goal     <190 mg/dL      Vit D, 25-Hydroxy 05/08/2023 26 (L)  30 - 96 ng/mL Final    Comment: Vitamin D deficiency.........<10 ng/mL                              Vitamin D insufficiency......10-29 ng/mL       Vitamin D sufficiency........> or equal to 30 ng/mL  Vitamin D toxicity............>100 ng/mL      Vitamin B-12 05/08/2023 396  210 - 950 pg/mL Final    TSH 05/08/2023 1.014  0.400 - 4.000 uIU/mL Final    Testosterone, Total 05/08/2023 295 (L)  304 - 1227 ng/dL Final    PSA, Screen 05/08/2023 0.72  0.00 - 4.00 ng/mL Final    Comment: The testing method is a chemiluminescent microparticle immunoassay   manufactured by [employer] and performed on the    or   [employer] system. Values obtained with different assay  manufacturers   for   methods may be different and cannot be used interchangeably.  PSA Expected levels:  Hormonal Therapy: <0.05 ng/ml  Prostatectomy: <0.01 ng/ml  Radiation Therapy: <1.00 ng/ml         EKG  01/24/2023 01/24/2023 normal sinus rhythm normal rate inferior Q-waves nonspecific ST-T changes subtle  EKG 05/01/2023 normal sinus rhythm normal rate subtle nonspecific ST-T changes inferior Q-waves    Echo   Results for orders placed or performed during the hospital encounter of 01/23/23   Echo   Result Value Ref Range    BSA 2.5 m2    TDI SEPTAL 0.08 m/s    LV LATERAL E/E' RATIO 4.42 m/s    LV SEPTAL E/E' RATIO 6.63 m/s    Left Ventricular Outflow Tract Mean Velocity 0.77 cm/s    Left Ventricular Outflow Tract Mean Gradient 2.65 mmHg    AORTIC VALVE CUSP SEPERATION 1.96 cm    TDI LATERAL 0.12 m/s    PV PEAK VELOCITY 1.21 cm/s    LVIDd 4.50 3.5 - 6.0 cm    IVS 1.38 0.6 - 1.1 cm    Posterior Wall 1.36 0.6 - 1.1 cm    LVIDs 3.57 2.1 - 4.0 cm    FS 21 28 - 44 %    LV mass 240.53 g    RVDD 3.44 cm    Left Ventricle Relative Wall Thickness 0.60 cm    AV Velocity Ratio 0.78     MV valve area p 1/2 method 2.93 cm2    E/A ratio 0.78     Mean e' 0.10 m/s    E wave deceleration time 249.57 msec    LVOT diameter 1.76 cm    LVOT area 2.4 cm2    LVOT peak jim 1.11 m/s    LVOT peak VTI 20.80 cm    Ao peak jim 1.43 m/s    LVOT stroke volume 50.58 cm3    AV peak gradient 8 mmHg    E/E' ratio 5.30 m/s    MV Peak E Jim 0.53 m/s    MV stenosis pressure 1/2 time 74.99 ms    MV Peak A Jim 0.68 m/s    LV Systolic Volume 53.39 mL    LV Systolic Volume Index 21.8 mL/m2    LV Diastolic Volume 116.73 mL    LV Diastolic Volume Index 47.64 mL/m2    LV Mass Index 98 g/m2    RA Major Axis 5.00 cm    Left Atrium Minor Axis 2.77 cm    Left Atrium Major Axis 5.40 cm    LA Volume Index (Mod) 18.8 mL/m2    LA volume (mod) 46.00 cm3    RA Width 2.01 cm    EF 60 %    Sinus 3.80 cm    Proximal aorta 3.60 cm    Narrative    · The left ventricle is  normal in size with mild concentric hypertrophy   and normal systolic function.  · The estimated ejection fraction is 60%.  · Normal left ventricular diastolic function.  · Mild right ventricular enlargement with normal right ventricular   systolic function.  · Indeterminate central venous pressure.          Imaging  X-Ray Chest AP Portable    Result Date: 1/23/2023  EXAMINATION: XR CHEST AP PORTABLE CLINICAL HISTORY: chest pain; TECHNIQUE: Single frontal view of the chest was performed. COMPARISON: 04/12/2019, 07/26/2018 FINDINGS: Cardiac silhouette is not enlarged.  Lungs are clear.  There is no pleural effusion or pneumothorax.  Osseous structures are intact.     No acute abnormality. Electronically signed by: Dinah Alegria Date:    01/23/2023 Time:    17:02    Echo    Result Date: 1/24/2023  · The left ventricle is normal in size with mild concentric hypertrophy and normal systolic function. · The estimated ejection fraction is 60%. · Normal left ventricular diastolic function. · Mild right ventricular enlargement with normal right ventricular systolic function. · Indeterminate central venous pressure.      Cardiac catheterization    Result Date: 1/25/2023    The ostial 1st Diag lesion was 50% stenosed.   The distal / apical  LAD lesion was 100% stenosed.   The 1st Mrg lesion was 60% stenosed (ostial to proximal).   The Mid RCA lesion was 80% stenosed.  Small vessel distal to lesion.   The 2nd Mrg-1 lesion was 90% stenosed with 0% stenosis post-intervention. This was the culprit lesion.   A STENT RESOLUTE CHARLY [id number] stent was successfully placed at 14 JAMIA for 30 sec in the ostial to proximal OM2 (overlapping with the prior stent) and post dilated with a 3.0 mm NC balloon.   Patent LAD stent.  Patent OM2 stent.  Patent LPDA stent. The procedure log was documented by Documenter: RT Anuja and verified by Rashaad Gambino MD. Date: 1/25/2023  Time: 8:20 AM       Prior coronary angiogram /  intervention:  See HPI    Assessment and Plan  1. Coronary artery disease involving native coronary artery of native heart without angina pectoris  Continue dual antiplatelet therapy with aspirin 81 Plavix 75 in addition to beta-blocker Crestor Zetia    2. ROQUE  MPI echo and refer to pulmonology  Maybe anginal equivalent or due to COPD given tobacco history    3. Essential hypertension  BB controlled    4. Hypercholesteremia  Statin zetia goal LDL < 70  Check lipids      Follow Up  3 months      Burton Segal MD, FACC, RPVI  Interventional Cardiology     Total professional time spent for the encounter: 30 minutes  Time was spent preparing to see the patient, reviewing results of prior testing, obtaining and/or reviewing separately obtained history, performing a medically appropriate examination and interview, counseling and educating the patient/family, ordering medications/tests/procedures, referring and communicating with other health care professionals, documenting clinical information in the electronic health record, and independently interpreting results.

## 2024-04-15 LAB
OHS QRS DURATION: 80 MS
OHS QTC CALCULATION: 408 MS

## 2024-04-19 DIAGNOSIS — I25.10 CORONARY ARTERY DISEASE INVOLVING NATIVE CORONARY ARTERY OF NATIVE HEART WITHOUT ANGINA PECTORIS: ICD-10-CM

## 2024-04-19 DIAGNOSIS — R07.9 CHEST PAIN: ICD-10-CM

## 2024-04-19 RX ORDER — METOPROLOL TARTRATE 25 MG/1
25 TABLET, FILM COATED ORAL 2 TIMES DAILY
Qty: 60 TABLET | Refills: 11 | Status: CANCELLED | OUTPATIENT
Start: 2024-04-19 | End: 2025-04-19

## 2024-04-19 RX ORDER — NAPROXEN SODIUM 220 MG/1
81 TABLET, FILM COATED ORAL DAILY
Qty: 90 TABLET | Refills: 3 | Status: SHIPPED | OUTPATIENT
Start: 2024-04-19 | End: 2025-04-19

## 2024-04-19 RX ORDER — CLOPIDOGREL BISULFATE 75 MG/1
75 TABLET ORAL DAILY
Qty: 90 TABLET | Refills: 3 | Status: SHIPPED | OUTPATIENT
Start: 2024-04-19 | End: 2025-04-19

## 2024-04-19 RX ORDER — NAPROXEN SODIUM 220 MG/1
81 TABLET, FILM COATED ORAL DAILY
Qty: 90 TABLET | Refills: 3 | Status: CANCELLED | OUTPATIENT
Start: 2024-04-19 | End: 2025-04-19

## 2024-04-19 RX ORDER — CLOPIDOGREL BISULFATE 75 MG/1
75 TABLET ORAL DAILY
Qty: 90 TABLET | Refills: 3 | Status: CANCELLED | OUTPATIENT
Start: 2024-04-19 | End: 2025-04-19

## 2024-04-19 RX ORDER — ROSUVASTATIN CALCIUM 40 MG/1
40 TABLET, COATED ORAL NIGHTLY
Qty: 90 TABLET | Refills: 3 | Status: SHIPPED | OUTPATIENT
Start: 2024-04-19 | End: 2025-04-19

## 2024-04-19 RX ORDER — METOPROLOL TARTRATE 25 MG/1
25 TABLET, FILM COATED ORAL 2 TIMES DAILY
Qty: 180 TABLET | Refills: 3 | Status: SHIPPED | OUTPATIENT
Start: 2024-04-19 | End: 2025-04-19

## 2024-04-19 RX ORDER — EZETIMIBE 10 MG/1
10 TABLET ORAL NIGHTLY
Qty: 90 TABLET | Refills: 3 | Status: CANCELLED | OUTPATIENT
Start: 2024-04-19 | End: 2025-04-19

## 2024-04-19 RX ORDER — ROSUVASTATIN CALCIUM 40 MG/1
40 TABLET, COATED ORAL NIGHTLY
Qty: 90 TABLET | Refills: 3 | Status: CANCELLED | OUTPATIENT
Start: 2024-04-19 | End: 2025-04-19

## 2024-04-19 RX ORDER — EZETIMIBE 10 MG/1
10 TABLET ORAL NIGHTLY
Qty: 90 TABLET | Refills: 3 | Status: SHIPPED | OUTPATIENT
Start: 2024-04-19 | End: 2025-04-19

## 2024-04-22 ENCOUNTER — OFFICE VISIT (OUTPATIENT)
Dept: PULMONOLOGY | Facility: CLINIC | Age: 54
End: 2024-04-22
Payer: MEDICARE

## 2024-04-22 VITALS
OXYGEN SATURATION: 97 % | SYSTOLIC BLOOD PRESSURE: 118 MMHG | HEART RATE: 73 BPM | DIASTOLIC BLOOD PRESSURE: 88 MMHG | BODY MASS INDEX: 37.09 KG/M2 | WEIGHT: 296.75 LBS

## 2024-04-22 DIAGNOSIS — R06.09 DOE (DYSPNEA ON EXERTION): Primary | ICD-10-CM

## 2024-04-22 DIAGNOSIS — E66.01 SEVERE OBESITY (BMI 35.0-39.9) WITH COMORBIDITY: ICD-10-CM

## 2024-04-22 DIAGNOSIS — R06.83 SNORING: ICD-10-CM

## 2024-04-22 DIAGNOSIS — Z87.891 HISTORY OF CIGARETTE SMOKING: ICD-10-CM

## 2024-04-22 PROCEDURE — 99214 OFFICE O/P EST MOD 30 MIN: CPT | Mod: PBBFAC,PN

## 2024-04-22 PROCEDURE — 99204 OFFICE O/P NEW MOD 45 MIN: CPT | Mod: S$PBB,,,

## 2024-04-22 PROCEDURE — 99999 PR PBB SHADOW E&M-EST. PATIENT-LVL IV: CPT | Mod: PBBFAC,,,

## 2024-04-22 RX ORDER — ALBUTEROL SULFATE 90 UG/1
2 AEROSOL, METERED RESPIRATORY (INHALATION) EVERY 4 HOURS PRN
Qty: 18 G | Refills: 5 | Status: SHIPPED | OUTPATIENT
Start: 2024-04-22 | End: 2025-04-22

## 2024-04-22 NOTE — PATIENT INSTRUCTIONS
-Obtain PFT to evaluate for COPD  -Obtain CT Chest for lung cancer screening  -Sleep medicine referral    -Start albuterol inhaler for sob or wheezing  -I will call to prescribe maintenance inhaler if needed.

## 2024-04-22 NOTE — PROGRESS NOTES
History and Physical Note  [facility]    Subjective:     Reason for visit: Dyspnea on exertion    Patient ID:  River Dale is a 53 y.o. male    Interval History:  Patient presents with his wife Sandra and young daughter. They provide additional history.     He is here today as a referral from cardiology for evaluation of dyspnea on excertion. He has a diagnosis of HTN, HLD, CAD post MI s/p PCI. History of cigarette use. Recent echo and stress test showing no abnormalities.     He has shortness of breath with exertion. Very limited exercise due musculoskeletal complaints. No shortness of breath with rest. His daily chores have become increasingly harder. He has to stop when mowing his lawn to catch his breath. This has been about the same for the last two years. Lightheaded during this activities. Not bothered by coughing since he has quit smoking    No weight loss, fevers. 20lb weight gain in the last year due to lifestyle changes.     1 incidence of URI in the last 3 months.     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     Seasonal allergies with no antihistamine use.     Reports snoring with witnessed apneic episodes by wife. [place] sleepiness scale: 11.     Additional Pulmonary History:  Childhood Illnesses: None;   Occupational:  Retired;   Environmental:  Pollen  Tobacco/Smokin.5PPD  for 45 years. Quit last year.   Social History     Tobacco Use   Smoking Status Former    Current packs/day: 0.00    Average packs/day: 2.0 packs/day for 45.0 years (90.0 ttl pk-yrs)    Types: Cigarettes    Start date:     Quit date:     Years since quittin.3   Smokeless Tobacco Former    Quit date: 3/26/2018       Objective:     Vitals:    24 0951   BP: 118/88   BP Location: Left arm   Patient Position: Sitting   BP Method: Large (Automatic)   Pulse: 73   SpO2: 97%   Weight: 134.6 kg (296 lb 11.8 oz)         Physical Exam  Constitutional:       General: He is  awake.      Appearance: Normal appearance. He is well-developed.   HENT:      Head: Normocephalic.   Pulmonary:      Effort: Pulmonary effort is normal. No respiratory distress.      Breath sounds: Decreased air movement present. No wheezing.   Skin:     General: Skin is warm.      Capillary Refill: Capillary refill takes less than 2 seconds.      Findings: No rash.      Nails: There is no clubbing.   Neurological:      Mental Status: He is alert.   Psychiatric:         Attention and Perception: Attention and perception normal.         Behavior: Behavior is cooperative.          Personal Diagnostic Review and Interpretation  CXR 01/2023: clear lung fields   CT Chest pending    Pertinent Studies Reviewed & Interpreted:     Pulmonary Function Tests:   pending    Echocardiograms:   04/19/2024    Left Ventricle: The left ventricle is normal in size. Mildly increased wall thickness. Normal wall motion. There is normal systolic function with a visually estimated ejection fraction of 55 - 60%. There is normal diastolic function.    Right Ventricle: Normal right ventricular cavity size. Systolic function is normal.     Other Pertinent Laboratories:  Lab Results   Component Value Date    WBC 8.45 05/08/2023    RBC 4.50 (L) 05/08/2023    HGB 14.1 05/08/2023    MCV 96 05/08/2023    MCH 31.3 (H) 05/08/2023    MCHC 32.6 05/08/2023    RDW 13.2 05/08/2023     05/08/2023    MPV 10.0 05/08/2023    GRAN 4.7 05/08/2023    GRAN 55.3 05/08/2023    LYMPH 2.9 05/08/2023    LYMPH 33.7 05/08/2023    MONO 0.6 05/08/2023    MONO 7.1 05/08/2023    EOS 0.3 05/08/2023    BASO 0.06 05/08/2023      Latest Reference Range & Units 03/21/18 07:31 03/22/18 04:57 04/16/18 14:11 06/12/18 11:46 06/13/18 02:53 06/14/18 04:40 06/17/18 23:25 07/26/18 22:44 04/12/19 22:02 10/02/21 23:02 01/23/23 16:38 01/24/23 02:30 01/24/23 09:41 01/25/23 03:22 01/26/23 04:01 05/08/23 09:05   Eos # 0.0 - 0.5 K/uL 0.2 0.1 0.2 0.3 0.4 0.3 0.3 0.4 0.2 0.1 0.2 0.2 0.2  0.2 0.2 0.3     Assessment & Plan:       Plan:  Clinical impression is resonably certain and repeated evaluation prn +/- follow up will be needed as below.      1. ROQUE (dyspnea on exertion)  Overview:  -Obtain PFT to evaluate for obstruction. Given strong smoking history, discussed with patient possible COPD diagnosis.     -Start albuterol inhaler for sob or wheezing  -I will call to prescribe maintenance inhaler if needed based on PFT results.     Orders:  -     Ambulatory referral/consult to Pulmonology  -     Complete PFT with bronchodilator; Future  -     CT Chest Lung Screening Low Dose; Future; Expected date: 04/22/2024  -     albuterol (VENTOLIN HFA) 90 mcg/actuation inhaler; Inhale 2 puffs into the lungs every 4 (four) hours as needed for Wheezing or Shortness of Breath. Rescue  Dispense: 18 g; Refill: 5    2. History of cigarette smoking  Overview:  Quit 2023. 90ttl pk-yrs history.   -Obtain LDCT to screen for lung cancer    Orders:  -     Complete PFT with bronchodilator; Future  -     CT Chest Lung Screening Low Dose; Future; Expected date: 04/22/2024    3. Snoring  Overview:  Referral placed for sleep medicine. [place] sleepiness scale 11. Witnessed apneic episodes.     Orders:  -     Ambulatory referral/consult to Sleep Disorders; Future; Expected date: 04/29/2024    4. Severe obesity (BMI 35.0-39.9) with comorbidity        Follow up in about 6 months (around 10/22/2024).    Discussed with patient above for education the following:      Patient Instructions   -Obtain PFT to evaluate for COPD  -Obtain CT Chest for lung cancer screening  -Sleep medicine referral    -Start albuterol inhaler for sob or wheezing  -I will call to prescribe maintenance inhaler if needed.     RETURN TO CLINIC IN 6 MONTHS     Total professional time spent for the encounter: 54 minutes  Time was spent preparing to see the patient, reviewing results of prior testing, obtaining and/or reviewing separately obtained history,  performing a medically appropriate examination and interview, counseling and educating the patient/family, ordering medications/tests/procedures, referring and communicating with other health care professionals, documenting clinical information in the electronic health record, and independently interpreting results.    Dara Rosario, DNP

## 2024-05-01 ENCOUNTER — TELEPHONE (OUTPATIENT)
Dept: PHARMACY | Facility: CLINIC | Age: 54
End: 2024-05-01
Payer: MEDICARE

## 2024-05-01 ENCOUNTER — PATIENT MESSAGE (OUTPATIENT)
Dept: PHARMACY | Facility: CLINIC | Age: 54
End: 2024-05-01
Payer: MEDICARE

## 2024-05-01 DIAGNOSIS — J44.9 CHRONIC OBSTRUCTIVE PULMONARY DISEASE, UNSPECIFIED COPD TYPE: Primary | ICD-10-CM

## 2024-05-01 DIAGNOSIS — Z87.891 HISTORY OF CIGARETTE SMOKING: ICD-10-CM

## 2024-05-01 NOTE — LETTER
May 20, 2024    River MARIBEL Dale  [address]  [place] [address]             [facility] Pharm[facility]  [address] [facility] [address]  Fax: [phone]   Dear Mr. Dale    I'm reaching out on behalf of [facility]s [facility]acy Patient Assistance Team. Danyelle Abraham referred you for medication assistance on 05/01/2024. We've tried to reach you via phone, MyChart, and mail. If you are still in need of assistance with your medications return the following documentation to the Pharmacy Patient Assistance Team at your earliest convenience.   Proof of household Income( such as social security statement, 1099 form, pension statement or 3 consecutive pay stubs, Copy of all Insurance cards( front and back), and Signed and dated HIPAA /Patient Information Forms        Please reach out to number listed below with follow-up questions. We look forward to hearing from you soon!       Please note this will be our final attempt to reach you regarding your assistance.     Sincerely,  Pharmacy Patient Assistance     [address] [facility]  [id number]  [place] [address]  Fax: [phone]  Email: [email or web address]

## 2024-05-09 ENCOUNTER — PATIENT MESSAGE (OUTPATIENT)
Dept: PHARMACY | Facility: CLINIC | Age: 54
End: 2024-05-09
Payer: MEDICARE

## 2024-07-03 ENCOUNTER — PATIENT MESSAGE (OUTPATIENT)
Dept: CARDIOLOGY | Facility: CLINIC | Age: 54
End: 2024-07-03
Payer: MEDICARE

## 2024-07-10 ENCOUNTER — OFFICE VISIT (OUTPATIENT)
Dept: CARDIOLOGY | Facility: CLINIC | Age: 54
End: 2024-07-10
Payer: MEDICARE

## 2024-07-10 VITALS
BODY MASS INDEX: 37.92 KG/M2 | HEART RATE: 72 BPM | DIASTOLIC BLOOD PRESSURE: 78 MMHG | HEIGHT: 75 IN | WEIGHT: 305 LBS | SYSTOLIC BLOOD PRESSURE: 125 MMHG

## 2024-07-10 DIAGNOSIS — Z95.5 S/P DRUG ELUTING CORONARY STENT PLACEMENT: ICD-10-CM

## 2024-07-10 DIAGNOSIS — I25.10 CORONARY ARTERY DISEASE INVOLVING NATIVE CORONARY ARTERY OF NATIVE HEART WITHOUT ANGINA PECTORIS: ICD-10-CM

## 2024-07-10 DIAGNOSIS — E78.00 HYPERCHOLESTEREMIA: ICD-10-CM

## 2024-07-10 DIAGNOSIS — I21.21 STEMI INVOLVING LEFT CIRCUMFLEX CORONARY ARTERY: Primary | ICD-10-CM

## 2024-07-10 DIAGNOSIS — I10 ESSENTIAL HYPERTENSION: ICD-10-CM

## 2024-07-10 PROCEDURE — 99213 OFFICE O/P EST LOW 20 MIN: CPT | Mod: PBBFAC,PN | Performed by: INTERNAL MEDICINE

## 2024-07-10 PROCEDURE — 99999 PR PBB SHADOW E&M-EST. PATIENT-LVL III: CPT | Mod: PBBFAC,,, | Performed by: INTERNAL MEDICINE

## 2024-07-10 NOTE — PROGRESS NOTES
[facility] - Cardiology [address]  Cardiology Clinic Note      Chief Complaint  Chief Complaint   Patient presents with    Follow-up       HPI:      52-year-old male with past medical history hepatic steatosis, tobacco use, COPD by imaging, hypertension, hyperlipidemia, CAD (left dominant or codominant) status post MI s/p PCI LPDA 2018 c/b wire perforation (previously) prior PCI same year proximal to mid LAD / OM1 / dLCx subsequently NSTEMI 01/2023 taken to cath lab culprit ostial to proximal OM2 status post PCI residual nonobstructive disease involving the ostial diagonal/ostial to proximal OM1/mid codominant RCA small vessel beyond the lesion) subsequent MPI 04/2024 no defect normal EF, echo 04/2024 normal EF normal diastolic function normal RV prior echo 01/24/2023 normal EF mild LVH normal diastolic function mild RVE with normal RV systolic function echo 2018 normal EF mild LVH normal diastolic function normal RV prior echo same year normal EF moderate LVH impaired relaxation     At discharge for above MI added Zetia to statin as the patient was unable to tolerate Lipitor 80 in the past  Changed Lipitor to Crestor 40 given LDL not at goal     The patient states he has chronic stable dyspnea on exertion worked up with MPI and echo as above also sent to pulmonology      Medications  Current Outpatient Medications   Medication Sig Dispense Refill    albuterol (VENTOLIN HFA) 90 mcg/actuation inhaler Inhale 2 puffs into the lungs every 4 (four) hours as needed for Wheezing or Shortness of Breath. Rescue 18 g 5    aspirin 81 MG Chew Take 1 tablet (81 mg total) by mouth once daily. 90 tablet 3    clopidogreL (PLAVIX) 75 mg tablet Take 1 tablet (75 mg total) by mouth once daily. 90 tablet 3    ezetimibe (ZETIA) 10 mg tablet Take 1 tablet (10 mg total) by mouth every evening. 90 tablet 3    fluticasone-umeclidin-vilanter (TRELEGY ELLIPTA) 100-62.5-25 mcg DsDv Inhale 1 puff into the lungs once daily. 60 each 5     metoprolol tartrate (LOPRESSOR) 25 MG tablet Take 1 tablet (25 mg total) by mouth 2 (two) times daily. 180 tablet 3    nitroGLYCERIN (NITROSTAT) 0.4 MG SL tablet Place 0.4 mg under the tongue every 5 (five) minutes as needed for Chest pain.      rosuvastatin (CRESTOR) 40 MG Tab Take 1 tablet (40 mg total) by mouth every evening. 90 tablet 3    traZODone (DESYREL) 50 MG tablet Take 50 mg by mouth nightly as needed for Insomnia. (Patient not taking: Reported on 7/10/2024)       No current facility-administered medications for this visit.        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    Hyperlipidemia     Hypertension     STEMI (ST elevation myocardial infarction) 03/2018     Past Surgical History:   Procedure Laterality Date    ANGIOGRAM, CORONARY ARTERY - Right  01/25/2023    prom OM 2 stent placed    COLONOSCOPY N/A 05/24/2019    Procedure: COLONOSCOPY;  Surgeon: Daniel Weston MD;  Location: [facility] ENDO;  Service: Endoscopy;  Laterality: N/A;    CORONARY ANGIOGRAPHY Right 1/25/2023    Procedure: ANGIOGRAM, CORONARY ARTERY;  Surgeon: Rashaad Gambino MD;  Location: [facility] CATH LAB;  Service: Cardiology;  Laterality: Right;    CORONARY ANGIOPLASTY WITH STENT PLACEMENT N/A 06/13/2018    Procedure: Angioplasty, Coronary Artery, With Stent Insertion;  Surgeon: Noe Benavides MD;  Location: [facility] CATH LAB;  Service: Cardiovascular;  Laterality: N/A;    CORONARY ANGIOPLASTY WITH STENT PLACEMENT Right 1/25/2023    Procedure: ANGIOPLASTY, CORONARY ARTERY, WITH STENT INSERTION;  Surgeon: Rashaad Gambino MD;  Location: [facility] CATH LAB;  Service: Cardiology;  Laterality: Right;    LEFT HEART CATHETERIZATION Left 06/13/2018    Procedure: Left heart cath;  Surgeon: Noe Benavides MD;  Location: [facility] CATH LAB;  Service: Cardiovascular;  Laterality: Left;     Social History     Socioeconomic History    Marital  status:    Tobacco Use    Smoking status: Former     Current packs/day: 0.00     Average packs/day: 2.0 packs/day for 45.0 years (90.0 ttl pk-yrs)     Types: Cigarettes     Start date:      Quit date:      Years since quittin.5    Smokeless tobacco: Former     Quit date: 3/26/2018   Substance and Sexual Activity    Alcohol use: No    Drug use: No     Social Determinants of Health     Financial Resource Strain: Low Risk  (2023)    Overall Financial Resource Strain (CARDIA)     Difficulty of Paying Living Expenses: Not hard at all   Food Insecurity: No Food Insecurity (2023)    Hunger Vital Sign     Worried About Running Out of Food in the Last Year: Never true     Ran Out of Food in the Last Year: Never true   Transportation Needs: No Transportation Needs (2023)    PRAPARE - Transportation     Lack of Transportation (Medical): No     Lack of Transportation (Non-Medical): No   Physical Activity: Unknown (2023)    Exercise Vital Sign     Days of Exercise per Week: 5 days   Stress: No Stress Concern Present (2023)    [employer] - Occupational Stress Questionnaire     Feeling of Stress : Only a little   Housing Stability: Low Risk  (2023)    Housing Stability Vital Sign     Unable to Pay for Housing in the Last Year: No     Number of Places Lived in the Last Year: 1     Unstable Housing in the Last Year: No     No family history on file.     Allergies  Review of patient's allergies indicates:  No Known Allergies    Review of Systems   Review of Systems   Constitutional: Negative for fever.   HENT:  Negative for nosebleeds.    Eyes:  Negative for visual disturbance.   Cardiovascular:  Positive for dyspnea on exertion. Negative for chest pain, claudication, palpitations and syncope.   Respiratory:  Negative for cough, hemoptysis and wheezing.    Endocrine: Negative for cold intolerance, heat intolerance, polyphagia and polyuria.    Hematologic/Lymphatic: Negative for bleeding problem.   Skin:  Negative for rash.   Musculoskeletal:  Negative for myalgias.   Gastrointestinal:  Negative for hematemesis, hematochezia, nausea and vomiting.   Genitourinary:  Negative for dysuria.   Neurological:  Negative for focal weakness and sensory change.   Psychiatric/Behavioral:  Negative for altered mental status.        Physical Exam  Vitals:    07/10/24 0954   BP: 125/78   Pulse: 72     Wt Readings from Last 1 Encounters:   07/10/24 (!) 138.3 kg (305 lb 0.1 oz)     Physical Exam  HENT:      Head: Normocephalic and atraumatic.      Mouth/Throat:      Mouth: Mucous membranes are moist.   Eyes:      Extraocular Movements: Extraocular movements intact.      Pupils: Pupils are equal, round, and reactive to light.   Neck:      Vascular: No carotid bruit or JVD.   Cardiovascular:      Rate and Rhythm: Normal rate and regular rhythm.      Pulses: Normal pulses and intact distal pulses.      Heart sounds: Normal heart sounds. No murmur heard.     No friction rub. No gallop.   Pulmonary:      Effort: Pulmonary effort is normal.      Breath sounds: Normal breath sounds.   Abdominal:      Tenderness: There is no abdominal tenderness. There is no guarding or rebound.   Musculoskeletal:      Right lower leg: No edema.      Left lower leg: No edema.   Skin:     General: Skin is warm and dry.      Capillary Refill: Capillary refill takes less than 2 seconds.   Neurological:      General: No focal deficit present.      Mental Status: He is alert and oriented to person, place, and time.   Psychiatric:         Mood and Affect: Mood normal.         Labs  Clinical Support on 04/30/2024   Component Date Value Ref Range Status    Interpretation 04/30/2024    Final                    Value:Spirometry is normal. Spirometry remains unimproved following bronchodilator. Lung volumes show mild pulmonary over inflation. Airway mechanics show decreased airway resistance. DLCO is  normal.   Â   Notes: The failure to demonstrate improvement in spirometry does not preclude a clinical response to a trial of bronchodilators. DLCO interpretation assumes a normal hemoglobin level.      Post FVC 04/30/2024 4.98  4.42 - 7.03 L Final    Post FEV1 04/30/2024 3.73  3.38 - 5.43 L Final    Post FEV1 FVC 04/30/2024 74.83  66.03 - 88.65 % Final    Post FEF 25 75 04/30/2024 2.94  1.90 - 5.47 L/s Final    Post PEF 04/30/2024 8.67  8.10 - 13.45 L/s Final    Post  04/30/2024 6.62  sec Final    Pre DLCO 04/30/2024 29.95  27.59 - 41.45 ml/(min*mmHg) Final    DLCO ADJ PRE 04/30/2024 29.95  27.59 - 41.45 ml/(min*mmHg) Final    DLCOVA PRE 04/30/2024 4.02  3.18 - 5.26 ml/(min*mmHg*L) Final    DLVAAdj PRE 04/30/2024 4.02  3.18 - 5.26 ml/(min*mmHg*L) Final    VA PRE 04/30/2024 7.46 (L)  8.03 - 8.03 L Final    IVC PRE 04/30/2024 4.98  4.42 - 7.03 L Final    Pre TLC 04/30/2024 9.52 (H)  7.03 - 9.33 L Final    VC PRE 04/30/2024 4.99  4.42 - 7.03 L Final    Pre FRC PL 04/30/2024 4.71  L Final    Pre ERV 04/30/2024 0.25  -79336.61 - 74610.39 L Final    Pre RV 04/30/2024 4.53 (H)  1.81 - 3.16 L Final    RVTLC PRE 04/30/2024 47.58 (H)  26.43 - 44.39 % Final    Raw PRE 04/30/2024 1.03 (L)  3.06 - 3.06 cmH2O*s/L Final    VTGRAWPRE 04/30/2024 6.24  L Final    Pre FVC 04/30/2024 4.78  4.42 - 7.03 L Final    Pre FEV1 04/30/2024 3.59  3.38 - 5.43 L Final    Pre FEV1 FVC 04/30/2024 74.98  66.03 - 88.65 % Final    Pre FEF 25 75 04/30/2024 2.88  1.90 - 5.47 L/s Final    Pre PEF 04/30/2024 7.73 (L)  8.10 - 13.45 L/s Final    Pre  04/30/2024 6.39  sec Final    FVC Ref 04/30/2024 5.72   Final    FVC LLN 04/30/2024 4.42   Final    FVC Pre Ref 04/30/2024 83.6  % Final    FVC Post Ref 04/30/2024 87.1  % Final    FVC Chg 04/30/2024 4.1  % Final    FEV1 Ref 04/30/2024 4.40   Final    FEV1 LLN 04/30/2024 3.38   Final    FEV1 Pre Ref 04/30/2024 81.5  % Final    FEV1 Post Ref 04/30/2024 84.7  % Final    FEV1 Chg 04/30/2024 3.9  %  Final    FEV1 FVC Ref 04/30/2024 77   Final    FEV1 FVC LLN 04/30/2024 66   Final    FEV1 FVC Pre Ref 04/30/2024 96.9  % Final    FEV1 FVC Post Ref 04/30/2024 96.8  % Final    FEV1 FVC Chg 04/30/2024 -0.2  % Final    FEF 25 75 Ref 04/30/2024 3.69   Final    FEF 25 75 LLN 04/30/2024 1.90   Final    FEF 25 75 Pre Ref 04/30/2024 78.1  % Final    FEF 25 75 Post Ref 04/30/2024 79.8  % Final    FEF 25 75 Chg 04/30/2024 2.1  % Final    PEF Ref 04/30/2024 10.77   Final    PEF LLN 04/30/2024 8.10   Final    PEF Pre Ref 04/30/2024 71.7  % Final    PEF Post Ref 04/30/2024 80.4  % Final    PEF Chg 04/30/2024 12.1  % Final    HHE415 Chg 04/30/2024 3.5  % Final    TLC Ref 04/30/2024 8.18   Final    TLC LLN 04/30/2024 7.03   Final    TLC Pre Ref 04/30/2024 116.4  % Final    VC Ref 04/30/2024 5.72   Final    VC LLN 04/30/2024 4.42   Final    VC Pre Ref 04/30/2024 87.2  % Final    FRCpleth Ref 04/30/2024 3.87   Final    FRCpleth LLN 04/30/2024 2.89   Final    FRCpleth PreRef 04/30/2024 121.6  % Final    ERV Ref 04/30/2024 1.39   Final    ERV LLN 04/30/2024 -96462.61   Final    ERV Pre Ref 04/30/2024 17.9  % Final    RV Ref 04/30/2024 2.48   Final    RV LLN 04/30/2024 1.81   Final    RV Pre Ref 04/30/2024 182.5  % Final    RVTLC Ref 04/30/2024 35   Final    RVTLC LLN 04/30/2024 26   Final    RVTLC Pre Ref 04/30/2024 134.4  % Final    Raw Ref 04/30/2024 3.06   Final    Raw LLN 04/30/2024 3.06   Final    Raw Pre Ref 04/30/2024 33.7  % Final    DLCO Single Breath Ref 04/30/2024 34.52   Final    DLCO Single Breath LLN 04/30/2024 27.59   Final    DLCO Single Breath Pre Ref 04/30/2024 86.8  % Final    DLCOc Single Breath Ref 04/30/2024 34.52   Final    DLCOc Single Breath LLN 04/30/2024 27.59   Final    DLCOc Single Breath Pre Ref 04/30/2024 86.8  % Final    DLCOVA Ref 04/30/2024 4.22   Final    DLCOVA LLN 04/30/2024 3.18   Final    DLCOVA Pre Ref 04/30/2024 95.2  % Final    DLCOc SBVA Ref 04/30/2024 4.22   Final    DLCOc SBVA LLN  04/30/2024 3.18   Final    DLCOc SBVA Pre Ref 04/30/2024 95.2  % Final    VA Single Breath Ref 04/30/2024 8.03   Final    VA Single Breath LLN 04/30/2024 8.03   Final    VA Single Breath Pre Ref 04/30/2024 92.9  % Final    IVC Single Breath Ref 04/30/2024 5.72   Final    IVC Single Breath LLN 04/30/2024 4.42   Final    IVC Single Breath Pre Ref 04/30/2024 87.0  % Final   Hospital Outpatient Visit on 04/19/2024   Component Date Value Ref Range Status    BSA 04/19/2024 2.68  m2 Final    LA volume (mod) 04/19/2024 42.00  cm3 Final    LA WIDTH 04/19/2024 2.8  cm Final    LA Volume Index (Mod) 04/19/2024 16.2  mL/m2 Final    RA Width 04/19/2024 2.1  cm Final    LVOT stroke volume 04/19/2024 30.43  cm3 Final    LVIDd 04/19/2024 4.35  3.5 - 6.0 cm Final    LV Systolic Volume 04/19/2024 42.86  mL Final    LV Systolic Volume Index 04/19/2024 16.5  mL/m2 Final    LVIDs 04/19/2024 3.26  2.1 - 4.0 cm Final    LV Diastolic Volume 04/19/2024 85.52  mL Final    LV Diastolic Volume Index 04/19/2024 32.89  mL/m2 Final    IVS 04/19/2024 1.30 (A)  0.6 - 1.1 cm Final    LVOT diameter 04/19/2024 1.48  cm Final    LVOT area 04/19/2024 1.7  cm2 Final    FS 04/19/2024 25 (A)  28 - 44 % Final    Left Ventricle Relative Wall Thick* 04/19/2024 0.59  cm Final    Posterior Wall 04/19/2024 1.29 (A)  0.6 - 1.1 cm Final    LV mass 04/19/2024 210.22  g Final    LV Mass Index 04/19/2024 81  g/m2 Final    MV Peak E Jim 04/19/2024 0.49  m/s Final    TDI LATERAL 04/19/2024 0.10  m/s Final    TDI SEPTAL 04/19/2024 0.07  m/s Final    E/E' ratio 04/19/2024 5.76  m/s Final    MV Peak A Jim 04/19/2024 0.64  m/s Final    TR Max Jim 04/19/2024 2.47  m/s Final    E/A ratio 04/19/2024 0.77   Final    IVRT 04/19/2024 87.54  msec Final    E wave deceleration time 04/19/2024 315.88  msec Final    LV SEPTAL E/E' RATIO 04/19/2024 7.00  m/s Final    LA Volume Index 04/19/2024 11.9  mL/m2 Final    LV LATERAL E/E' RATIO 04/19/2024 4.90  m/s Final    LA volume  04/19/2024 30.83  cm3 Final    LVOT peak cole 04/19/2024 0.90  m/s Final    Left Ventricular Outflow Tract Camrina* 04/19/2024 0.67  cm/s Final    Left Ventricular Outflow Tract Carmina* 04/19/2024 1.91  mmHg Final    RVDD 04/19/2024 3.02  cm Final    LA size 04/19/2024 3.31  cm Final    Left Atrium Minor Axis 04/19/2024 3.84  cm Final    Left Atrium Major Axis 04/19/2024 3.99  cm Final    RA Major Axis 04/19/2024 2.95  cm Final    AV mean gradient 04/19/2024 3  mmHg Final    AV peak gradient 04/19/2024 5  mmHg Final    Ao peak cole 04/19/2024 1.15  m/s Final    Ao VTI 04/19/2024 24.10  cm Final    LVOT peak VTI 04/19/2024 17.70  cm Final    AV valve area 04/19/2024 1.26  cm² Final    AV Velocity Ratio 04/19/2024 0.78   Final    AV index (prosthetic) 04/19/2024 0.73   Final    MIKE by Velocity Ratio 04/19/2024 1.35  cm² Final    Mr max cole 04/19/2024 0.10  m/s Final    MV mean gradient 04/19/2024 1  mmHg Final    MV peak gradient 04/19/2024 2  mmHg Final    MV stenosis pressure 1/2 time 04/19/2024 87.71  ms Final    MV valve area p 1/2 method 04/19/2024 2.51  cm2 Final    MV valve area by continuity eq 04/19/2024 1.17  cm2 Final    MV VTI 04/19/2024 26.1  cm Final    Triscuspid Valve Regurgitation Pea* 04/19/2024 24  mmHg Final    PV PEAK VELOCITY 04/19/2024 0.92  m/s Final    PV peak gradient 04/19/2024 3  mmHg Final    Pulmonary Valve Mean Velocity 04/19/2024 0.59  m/s Final    Ao root annulus 04/19/2024 3.08  cm Final    STJ 04/19/2024 2.94  cm Final    Ascending aorta 04/19/2024 3.23  cm Final    IVC diameter 04/19/2024 1.22  cm Final    RV/LV Ratio 04/19/2024 0.69  cm Final    Mean e' 04/19/2024 0.09  m/s Final    ZLVIDS 04/19/2024 -9.50   Final    ZLVIDD 04/19/2024 -14.46   Final    AORTIC VALVE CUSP SEPERATION 04/19/2024 2.12  cm Final    TV resting pulmonary artery pressu* 04/19/2024 24  mmHg Final    RV TB RVSP 04/19/2024 2  mmHg Final    Est. RA pres 04/19/2024 0  mmHg Final   Hospital Outpatient Visit on 04/19/2024    Component Date Value Ref Range Status    85% Max Predicted HR 04/19/2024 140   Final    Max Predicted HR 04/19/2024 165   Final    [facility]HS CV CPX PATIENT IS MALE 04/19/2024 1.0   Final    [facility] CV CPX PATIENT IS FEMALE 04/19/2024 0.0   Final    HR at rest 04/19/2024 78  bpm Final    Systolic blood pressure 04/19/2024 120  mmHg Final    Diastolic blood pressure 04/19/2024 92  mmHg Final    RPP 04/19/2024 9,360   Final    Exercise duration (min) 04/19/2024 4  minutes Final    Exercise duration (sec) 04/19/2024 0  seconds Final    Peak HR 04/19/2024 117.0  bpm Final    Peak Systolic BP 04/19/2024 123  mmHg Final    Peak Diatolic BP 04/19/2024 95  mmHg Final    Peak RPP 04/19/2024 14,391   Final    % Max HR Achieved 04/19/2024 71   Final    1 Minute Recovery HR 04/19/2024 94  bpm Final    dose 04/19/2024 0.4  mg Final    Pharm Time 04/19/2024 1,024  min Final   Office Visit on 04/12/2024   Component Date Value Ref Range Status    QRS Duration 04/12/2024 80  ms Final    [facility] QTC Calculation 04/12/2024 408  ms Final       EKG  01/24/2023 01/24/2023 normal sinus rhythm normal rate inferior Q-waves nonspecific ST-T changes subtle  EKG 05/01/2023 normal sinus rhythm normal rate subtle nonspecific ST-T changes inferior Q-waves    Echo   Results for orders placed or performed during the hospital encounter of 04/19/24   Echo   Result Value Ref Range    BSA 2.68 m2    LA volume (mod) 42.00 cm3    LA WIDTH 2.8 cm    LA Volume Index (Mod) 16.2 mL/m2    RA Width 2.1 cm    LVOT stroke volume 30.43 cm3    LVIDd 4.35 3.5 - 6.0 cm    LV Systolic Volume 42.86 mL    LV Systolic Volume Index 16.5 mL/m2    LVIDs 3.26 2.1 - 4.0 cm    LV Diastolic Volume 85.52 mL    LV Diastolic Volume Index 32.89 mL/m2    IVS 1.30 (A) 0.6 - 1.1 cm    LVOT diameter 1.48 cm    LVOT area 1.7 cm2    FS 25 (A) 28 - 44 %    Left Ventricle Relative Wall Thickness 0.59 cm    Posterior Wall 1.29 (A) 0.6 - 1.1 cm    LV mass 210.22 g    LV Mass Index 81 g/m2    MV Peak E  Jim 0.49 m/s    TDI LATERAL 0.10 m/s    TDI SEPTAL 0.07 m/s    E/E' ratio 5.76 m/s    MV Peak A Jim 0.64 m/s    TR Max Jim 2.47 m/s    E/A ratio 0.77     IVRT 87.54 msec    E wave deceleration time 315.88 msec    LV SEPTAL E/E' RATIO 7.00 m/s    LA Volume Index 11.9 mL/m2    LV LATERAL E/E' RATIO 4.90 m/s    LA volume 30.83 cm3    LVOT peak jim 0.90 m/s    Left Ventricular Outflow Tract Mean Velocity 0.67 cm/s    Left Ventricular Outflow Tract Mean Gradient 1.91 mmHg    RVDD 3.02 cm    LA size 3.31 cm    Left Atrium Minor Axis 3.84 cm    Left Atrium Major Axis 3.99 cm    RA Major Axis 2.95 cm    AV mean gradient 3 mmHg    AV peak gradient 5 mmHg    Ao peak jim 1.15 m/s    Ao VTI 24.10 cm    LVOT peak VTI 17.70 cm    AV valve area 1.26 cm²    AV Velocity Ratio 0.78     AV index (prosthetic) 0.73     MIKE by Velocity Ratio 1.35 cm²    Mr max jim 0.10 m/s    MV mean gradient 1 mmHg    MV peak gradient 2 mmHg    MV stenosis pressure 1/2 time 87.71 ms    MV valve area p 1/2 method 2.51 cm2    MV valve area by continuity eq 1.17 cm2    MV VTI 26.1 cm    Triscuspid Valve Regurgitation Peak Gradient 24 mmHg    PV PEAK VELOCITY 0.92 m/s    PV peak gradient 3 mmHg    Pulmonary Valve Mean Velocity 0.59 m/s    Ao root annulus 3.08 cm    STJ 2.94 cm    Ascending aorta 3.23 cm    IVC diameter 1.22 cm    RV/LV Ratio 0.69 cm    Mean e' 0.09 m/s    ZLVIDS -9.50     ZLVIDD -14.46     AORTIC VALVE CUSP SEPERATION 2.12 cm    TV resting pulmonary artery pressure 24 mmHg    RV TB RVSP 2 mmHg    Est. RA pres 0 mmHg    Narrative      Left Ventricle: The left ventricle is normal in size. Mildly increased   wall thickness. Normal wall motion. There is normal systolic function with   a visually estimated ejection fraction of 55 - 60%. There is normal   diastolic function.    Right Ventricle: Normal right ventricular cavity size. Systolic   function is normal.         Imaging  X-Ray Chest AP Portable    Result Date: 1/23/2023  EXAMINATION: XR  CHEST AP PORTABLE CLINICAL HISTORY: chest pain; TECHNIQUE: Single frontal view of the chest was performed. COMPARISON: 04/12/2019, 07/26/2018 FINDINGS: Cardiac silhouette is not enlarged.  Lungs are clear.  There is no pleural effusion or pneumothorax.  Osseous structures are intact.     No acute abnormality. Electronically signed by: Dinah Alegria Date:    01/23/2023 Time:    17:02    Echo    Result Date: 1/24/2023  · The left ventricle is normal in size with mild concentric hypertrophy and normal systolic function. · The estimated ejection fraction is 60%. · Normal left ventricular diastolic function. · Mild right ventricular enlargement with normal right ventricular systolic function. · Indeterminate central venous pressure.      Cardiac catheterization    Result Date: 1/25/2023    The ostial 1st Diag lesion was 50% stenosed.   The distal / apical  LAD lesion was 100% stenosed.   The 1st Mrg lesion was 60% stenosed (ostial to proximal).   The Mid RCA lesion was 80% stenosed.  Small vessel distal to lesion.   The 2nd Mrg-1 lesion was 90% stenosed with 0% stenosis post-intervention. This was the culprit lesion.   A STENT RESOLUTE CHARLY [id number] stent was successfully placed at 14 JAMIA for 30 sec in the ostial to proximal OM2 (overlapping with the prior stent) and post dilated with a 3.0 mm NC balloon.   Patent LAD stent.  Patent OM2 stent.  Patent LPDA stent. The procedure log was documented by Documenter: RT Anuja and verified by Rashaad Gambino MD. Date: 1/25/2023  Time: 8:20 AM       Prior coronary angiogram / intervention:  See HPI    Assessment and Plan  1. Coronary artery disease involving native coronary artery of native heart without angina pectoris  Continue dual antiplatelet therapy with aspirin 81 Plavix 75 in addition to beta-blocker Crestor Zetia    2. ROQUE  Suspicious for pulmonary etiology  Follow up with pulmonology  MPI and echo not concerning    3. Essential hypertension  BB  controlled    4. Hypercholesteremia  Statin zetia goal LDL < 70  Check lipids      Follow Up  Six months      Burton Segal MD, FACC, RPVI  Interventional Cardiology     Total professional time spent for the encounter: 30 minutes  Time was spent preparing to see the patient, reviewing results of prior testing, obtaining and/or reviewing separately obtained history, performing a medically appropriate examination and interview, counseling and educating the patient/family, ordering medications/tests/procedures, referring and communicating with other health care professionals, documenting clinical information in the electronic health record, and independently interpreting results.

## 2025-08-26 ENCOUNTER — TELEPHONE (OUTPATIENT)
Dept: CARDIOLOGY | Facility: CLINIC | Age: 55
End: 2025-08-26
Payer: MEDICARE